# Patient Record
Sex: FEMALE | Race: WHITE | URBAN - METROPOLITAN AREA
[De-identification: names, ages, dates, MRNs, and addresses within clinical notes are randomized per-mention and may not be internally consistent; named-entity substitution may affect disease eponyms.]

---

## 2020-06-09 ENCOUNTER — OFFICE VISIT (OUTPATIENT)
Dept: FAMILY MEDICINE CLINIC | Facility: CLINIC | Age: 50
End: 2020-06-09
Payer: COMMERCIAL

## 2020-06-09 VITALS
RESPIRATION RATE: 18 BRPM | SYSTOLIC BLOOD PRESSURE: 120 MMHG | OXYGEN SATURATION: 98 % | HEIGHT: 65 IN | HEART RATE: 66 BPM | BODY MASS INDEX: 28.32 KG/M2 | WEIGHT: 170 LBS | DIASTOLIC BLOOD PRESSURE: 80 MMHG

## 2020-06-09 DIAGNOSIS — Z00.00 ENCOUNTER FOR WELLNESS EXAMINATION: Primary | ICD-10-CM

## 2020-06-09 PROCEDURE — 99396 PREV VISIT EST AGE 40-64: CPT | Performed by: FAMILY MEDICINE

## 2020-06-09 NOTE — PROGRESS NOTES
Patient presents for wellness visit and no complaints. She is never smoked cigarettes drinks alcohol modestly and works as a  currently on for low. No family history of cancers.     He is up-to-date on mammography no abnormalities of sign

## 2020-09-03 LAB
ABSOLUTE BASOPHILS: 30 CELLS/UL (ref 0–200)
ABSOLUTE EOSINOPHILS: 160 CELLS/UL (ref 15–500)
ABSOLUTE LYMPHOCYTES: 1725 CELLS/UL (ref 850–3900)
ABSOLUTE MONOCYTES: 675 CELLS/UL (ref 200–950)
ABSOLUTE NEUTROPHILS: 2410 CELLS/UL (ref 1500–7800)
ALBUMIN/GLOBULIN RATIO: 1.8 (CALC) (ref 1–2.5)
ALBUMIN: 4.2 G/DL (ref 3.6–5.1)
ALKALINE PHOSPHATASE: 53 U/L (ref 37–153)
ALT: 13 U/L (ref 6–29)
AST: 18 U/L (ref 10–35)
BASOPHILS: 0.6 %
BILIRUBIN, TOTAL: 0.7 MG/DL (ref 0.2–1.2)
BUN: 16 MG/DL (ref 7–25)
CALCIUM: 9.4 MG/DL (ref 8.6–10.4)
CARBON DIOXIDE: 27 MMOL/L (ref 20–32)
CHLORIDE: 107 MMOL/L (ref 98–110)
CHOL/HDLC RATIO: 3.4 (CALC)
CHOLESTEROL, TOTAL: 209 MG/DL
CREATININE: 0.78 MG/DL (ref 0.5–1.05)
EGFR IF AFRICN AM: 103 ML/MIN/1.73M2
EGFR IF NONAFRICN AM: 89 ML/MIN/1.73M2
EOSINOPHILS: 3.2 %
GLOBULIN: 2.4 G/DL (CALC) (ref 1.9–3.7)
GLUCOSE: 93 MG/DL (ref 65–99)
HDL CHOLESTEROL: 62 MG/DL
HEMATOCRIT: 36.7 % (ref 35–45)
HEMOGLOBIN: 12.2 G/DL (ref 11.7–15.5)
LDL-CHOLESTEROL: 122 MG/DL (CALC)
LYMPHOCYTES: 34.5 %
MCH: 30.2 PG (ref 27–33)
MCHC: 33.2 G/DL (ref 32–36)
MCV: 90.8 FL (ref 80–100)
MONOCYTES: 13.5 %
MPV: 10.2 FL (ref 7.5–12.5)
NEUTROPHILS: 48.2 %
NON-HDL CHOLESTEROL: 147 MG/DL (CALC)
PLATELET COUNT: 369 THOUSAND/UL (ref 140–400)
POTASSIUM: 4.9 MMOL/L (ref 3.5–5.3)
PROTEIN, TOTAL: 6.6 G/DL (ref 6.1–8.1)
RDW: 12.7 % (ref 11–15)
RED BLOOD CELL COUNT: 4.04 MILLION/UL (ref 3.8–5.1)
SODIUM: 139 MMOL/L (ref 135–146)
TRIGLYCERIDES: 133 MG/DL
TSH: 1.24 MIU/L
VITAMIN D, 25-OH, TOTAL: 25 NG/ML (ref 30–100)
WHITE BLOOD CELL COUNT: 5 THOUSAND/UL (ref 3.8–10.8)

## 2021-12-07 ENCOUNTER — TELEMEDICINE (OUTPATIENT)
Dept: FAMILY MEDICINE CLINIC | Facility: CLINIC | Age: 51
End: 2021-12-07
Payer: COMMERCIAL

## 2021-12-07 DIAGNOSIS — U07.1 COVID: Primary | ICD-10-CM

## 2021-12-07 PROCEDURE — 99213 OFFICE O/P EST LOW 20 MIN: CPT | Performed by: FAMILY MEDICINE

## 2021-12-07 NOTE — PROGRESS NOTES
This visit is conducted using Telemedicine with live, interactive video and audio. Patient has been referred to the [facility] website at [email or web address] to review the yearly Consent to Treat document.     Patient understands and accepts financial res

## 2023-01-23 ENCOUNTER — OFFICE VISIT (OUTPATIENT)
Dept: FAMILY MEDICINE CLINIC | Facility: CLINIC | Age: 53
End: 2023-01-23
Payer: COMMERCIAL

## 2023-01-23 VITALS
HEART RATE: 92 BPM | WEIGHT: 215 LBS | DIASTOLIC BLOOD PRESSURE: 86 MMHG | RESPIRATION RATE: 16 BRPM | SYSTOLIC BLOOD PRESSURE: 128 MMHG | HEIGHT: 65 IN | BODY MASS INDEX: 35.82 KG/M2 | OXYGEN SATURATION: 98 %

## 2023-01-23 DIAGNOSIS — Z12.11 SCREENING FOR COLON CANCER: ICD-10-CM

## 2023-01-23 DIAGNOSIS — Z13.220 LIPID SCREENING: ICD-10-CM

## 2023-01-23 DIAGNOSIS — Z13.29 THYROID DISORDER SCREEN: ICD-10-CM

## 2023-01-23 DIAGNOSIS — Z13.0 SCREENING FOR DEFICIENCY ANEMIA: ICD-10-CM

## 2023-01-23 DIAGNOSIS — Z00.00 WELLNESS EXAMINATION: Primary | ICD-10-CM

## 2023-01-23 DIAGNOSIS — Z23 NEED FOR TETANUS BOOSTER: ICD-10-CM

## 2023-01-23 DIAGNOSIS — Z12.31 ENCOUNTER FOR SCREENING MAMMOGRAM FOR MALIGNANT NEOPLASM OF BREAST: ICD-10-CM

## 2023-01-23 PROCEDURE — 3079F DIAST BP 80-89 MM HG: CPT | Performed by: FAMILY MEDICINE

## 2023-01-23 PROCEDURE — 90471 IMMUNIZATION ADMIN: CPT | Performed by: FAMILY MEDICINE

## 2023-01-23 PROCEDURE — 99396 PREV VISIT EST AGE 40-64: CPT | Performed by: FAMILY MEDICINE

## 2023-01-23 PROCEDURE — 3074F SYST BP LT 130 MM HG: CPT | Performed by: FAMILY MEDICINE

## 2023-01-23 PROCEDURE — 90715 TDAP VACCINE 7 YRS/> IM: CPT | Performed by: FAMILY MEDICINE

## 2023-01-23 PROCEDURE — 3008F BODY MASS INDEX DOCD: CPT | Performed by: FAMILY MEDICINE

## 2023-01-23 RX ORDER — FLUTICASONE PROPIONATE 50 MCG
SPRAY, SUSPENSION (ML) NASAL
COMMUNITY

## 2023-01-23 RX ORDER — ASCORBIC ACID 500 MG
TABLET ORAL
COMMUNITY

## 2023-01-23 RX ORDER — CHLORAL HYDRATE 500 MG
1000 CAPSULE ORAL DAILY
COMMUNITY

## 2023-01-24 ENCOUNTER — OFFICE VISIT (OUTPATIENT)
Dept: OBGYN CLINIC | Facility: CLINIC | Age: 53
End: 2023-01-24
Payer: COMMERCIAL

## 2023-01-24 VITALS
HEIGHT: 65 IN | HEART RATE: 71 BPM | BODY MASS INDEX: 35.82 KG/M2 | WEIGHT: 215 LBS | DIASTOLIC BLOOD PRESSURE: 76 MMHG | SYSTOLIC BLOOD PRESSURE: 114 MMHG

## 2023-01-24 DIAGNOSIS — Z01.419 WELL WOMAN EXAM WITH ROUTINE GYNECOLOGICAL EXAM: Primary | ICD-10-CM

## 2023-01-24 DIAGNOSIS — Z12.4 SCREENING FOR CERVICAL CANCER: ICD-10-CM

## 2023-01-24 PROCEDURE — 3008F BODY MASS INDEX DOCD: CPT | Performed by: NURSE PRACTITIONER

## 2023-01-24 PROCEDURE — 99386 PREV VISIT NEW AGE 40-64: CPT | Performed by: NURSE PRACTITIONER

## 2023-01-24 PROCEDURE — 3074F SYST BP LT 130 MM HG: CPT | Performed by: NURSE PRACTITIONER

## 2023-01-24 PROCEDURE — 3078F DIAST BP <80 MM HG: CPT | Performed by: NURSE PRACTITIONER

## 2023-01-24 RX ORDER — SOY ISOFLAV/BCOHOSH/CISSUS QUA 198 MG
CAPSULE ORAL
COMMUNITY

## 2023-01-27 LAB — HPV MRNA E6/E7: NOT DETECTED

## 2023-01-30 ENCOUNTER — HOSPITAL ENCOUNTER (OUTPATIENT)
Dept: MAMMOGRAPHY | Age: 53
Discharge: HOME | End: 2023-01-30
Attending: FAMILY MEDICINE
Payer: COMMERCIAL

## 2023-01-30 DIAGNOSIS — Z12.31 ENCOUNTER FOR SCREENING MAMMOGRAM FOR MALIGNANT NEOPLASM OF BREAST: ICD-10-CM

## 2023-01-30 PROCEDURE — 77063 BREAST TOMOSYNTHESIS BI: CPT | Performed by: FAMILY MEDICINE

## 2023-01-30 PROCEDURE — 77067 SCR MAMMO BI INCL CAD: CPT | Performed by: FAMILY MEDICINE

## 2023-02-04 LAB
ABSOLUTE BASOPHILS: 39 CELLS/UL (ref 0–200)
ABSOLUTE EOSINOPHILS: 149 CELLS/UL (ref 15–500)
ABSOLUTE LYMPHOCYTES: 1617 CELLS/UL (ref 850–3900)
ABSOLUTE MONOCYTES: 886 CELLS/UL (ref 200–950)
ABSOLUTE NEUTROPHILS: 2811 CELLS/UL (ref 1500–7800)
ALBUMIN/GLOBULIN RATIO: 1.9 (CALC) (ref 1–2.5)
ALBUMIN: 4.7 G/DL (ref 3.6–5.1)
ALKALINE PHOSPHATASE: 53 U/L (ref 37–153)
ALT: 62 U/L (ref 6–29)
AST: 42 U/L (ref 10–35)
BASOPHILS: 0.7 %
BILIRUBIN, TOTAL: 0.4 MG/DL (ref 0.2–1.2)
BUN: 14 MG/DL (ref 7–25)
CALCIUM: 9.6 MG/DL (ref 8.6–10.4)
CARBON DIOXIDE: 28 MMOL/L (ref 20–32)
CHLORIDE: 106 MMOL/L (ref 98–110)
CHOL/HDLC RATIO: 4.1 (CALC)
CHOLESTEROL, TOTAL: 215 MG/DL
CREATININE: 0.8 MG/DL (ref 0.5–1.03)
EGFR: 89 ML/MIN/1.73M2
EOSINOPHILS: 2.7 %
GLOBULIN: 2.5 G/DL (CALC) (ref 1.9–3.7)
GLUCOSE: 108 MG/DL (ref 65–99)
HDL CHOLESTEROL: 52 MG/DL
HEMATOCRIT: 40.7 % (ref 35–45)
HEMOGLOBIN: 13.2 G/DL (ref 11.7–15.5)
LDL-CHOLESTEROL: 132 MG/DL (CALC)
LYMPHOCYTES: 29.4 %
MCH: 30.2 PG (ref 27–33)
MCHC: 32.4 G/DL (ref 32–36)
MCV: 93.1 FL (ref 80–100)
MONOCYTES: 16.1 %
MPV: 9.8 FL (ref 7.5–12.5)
NEUTROPHILS: 51.1 %
NON-HDL CHOLESTEROL: 163 MG/DL (CALC)
PLATELET COUNT: 335 THOUSAND/UL (ref 140–400)
POTASSIUM: 4.5 MMOL/L (ref 3.5–5.3)
PROTEIN, TOTAL: 7.2 G/DL (ref 6.1–8.1)
RDW: 12.1 % (ref 11–15)
RED BLOOD CELL COUNT: 4.37 MILLION/UL (ref 3.8–5.1)
SODIUM: 141 MMOL/L (ref 135–146)
TRIGLYCERIDES: 172 MG/DL
TSH W/REFLEX TO FT4: 1 MIU/L
WHITE BLOOD CELL COUNT: 5.5 THOUSAND/UL (ref 3.8–10.8)

## 2023-02-05 ENCOUNTER — PATIENT MESSAGE (OUTPATIENT)
Dept: FAMILY MEDICINE CLINIC | Facility: CLINIC | Age: 53
End: 2023-02-05

## 2023-02-06 ENCOUNTER — PATIENT MESSAGE (OUTPATIENT)
Dept: FAMILY MEDICINE CLINIC | Facility: CLINIC | Age: 53
End: 2023-02-06

## 2023-02-06 DIAGNOSIS — E78.9 SERUM CHOLESTEROL ELEVATED: ICD-10-CM

## 2023-02-06 DIAGNOSIS — R74.8 LIVER ENZYME ELEVATION: Primary | ICD-10-CM

## 2023-02-06 NOTE — TELEPHONE ENCOUNTER
From: Kenney Howard  To: Kelsea Russell MD  Sent: 2/5/2023 11:21 AM CST  Subject: Liver Panel Results    We briefly discussed both my father and brother having elevated lipid panels throughout their medical history, although this is the first time I'm seeing that mine are elevated. One other thought that occurred to me after my appointment is that I have been using an over the counter anti-fungal liquid on my toe nail. I've opted not to use oral fungal Rx in the past because I know it can be hard on the liver. Could this product be causing the elevated levels? Pro Clearz 1%Tolnaftate. I have been using this over 16 weeks, but since I was seeing improvement with using it, I hadn't thought about discontinuing use yet. Any thoughts? I've stopped using it now until we discuss.

## 2023-02-06 NOTE — TELEPHONE ENCOUNTER
From: Geraldine Jacobson  To: Markus George MD  Sent: 2/5/2023 11:21 AM CST  Subject: Liver Panel Results    We briefly discussed both my father and brother having elevated lipid panels throughout their medical history, although this is the first time I'm seeing that mine are elevated. One other thought that occurred to me after my appointment is that I have been using an over the counter anti-fungal liquid on my toe nail. I've opted not to use oral fungal Rx in the past because I know it can be hard on the liver. Could this product be causing the elevated levels? Pro Clearz 1%Tolnaftate. I have been using this over 16 weeks, but since I was seeing improvement with using it, I hadn't thought about discontinuing use yet. Any thoughts? I've stopped using it now until we discuss.

## 2023-02-06 NOTE — TELEPHONE ENCOUNTER
I agree labs overall look good other than elevated lipids and mildly elevated liver enzymes    The 10-year ASCVD risk score (Neyda COSTA, et al., 2019) is: 1.4%    Values used to calculate the score:      Age: 46 years      Sex: Female      Is Non- : No      Diabetic: No      Tobacco smoker: No      Systolic Blood Pressure: 873 mmHg      Is BP treated: No      HDL Cholesterol: 52 mg/dL      Total Cholesterol: 215 mg/dL    Though still quite low 10 year vascular risk. I wouldn't expect the topical medicine to have that impact. My usual advice is, if diet isn't already close to a mediterranean diet, consider a diet such as that, low in red meats, high in plant based foods, and recheck and see if any substantial difference.     Mannie Fay MD

## 2023-02-06 NOTE — TELEPHONE ENCOUNTER
[facility] now wishes to know when you want her to repeat lipid panel and liver enzymes? 3 Months?

## 2023-03-09 PROBLEM — Z12.11 SPECIAL SCREENING FOR MALIGNANT NEOPLASM OF COLON: Status: ACTIVE | Noted: 2023-03-09

## 2023-06-21 ENCOUNTER — OFFICE VISIT (OUTPATIENT)
Dept: FAMILY MEDICINE CLINIC | Facility: CLINIC | Age: 53
End: 2023-06-21
Payer: COMMERCIAL

## 2023-06-21 VITALS
DIASTOLIC BLOOD PRESSURE: 88 MMHG | TEMPERATURE: 98 F | SYSTOLIC BLOOD PRESSURE: 128 MMHG | WEIGHT: 211 LBS | OXYGEN SATURATION: 96 % | HEART RATE: 80 BPM | BODY MASS INDEX: 35.16 KG/M2 | HEIGHT: 65 IN | RESPIRATION RATE: 16 BRPM

## 2023-06-21 DIAGNOSIS — R39.9 URINARY SYMPTOM OR SIGN: Primary | ICD-10-CM

## 2023-06-21 LAB
APPEARANCE: CLEAR
BILIRUBIN: NEGATIVE
GLUCOSE (URINE DIPSTICK): NEGATIVE MG/DL
KETONES (URINE DIPSTICK): NEGATIVE MG/DL
LEUKOCYTES: NEGATIVE
MULTISTIX LOT#: NORMAL NUMERIC
NITRITE, URINE: NEGATIVE
OCCULT BLOOD: NEGATIVE
PH, URINE: 6.5 (ref 4.5–8)
PROTEIN (URINE DIPSTICK): NEGATIVE MG/DL
SPECIFIC GRAVITY: 1 (ref 1–1.03)
UROBILINOGEN,SEMI-QN: 0.2 MG/DL (ref 0–1.9)

## 2023-06-21 PROCEDURE — 87086 URINE CULTURE/COLONY COUNT: CPT | Performed by: PHYSICIAN ASSISTANT

## 2023-06-21 PROCEDURE — 99213 OFFICE O/P EST LOW 20 MIN: CPT | Performed by: PHYSICIAN ASSISTANT

## 2023-06-21 PROCEDURE — 3008F BODY MASS INDEX DOCD: CPT | Performed by: PHYSICIAN ASSISTANT

## 2023-06-21 PROCEDURE — 81003 URINALYSIS AUTO W/O SCOPE: CPT | Performed by: PHYSICIAN ASSISTANT

## 2023-06-21 PROCEDURE — 3079F DIAST BP 80-89 MM HG: CPT | Performed by: PHYSICIAN ASSISTANT

## 2023-06-21 PROCEDURE — 3074F SYST BP LT 130 MM HG: CPT | Performed by: PHYSICIAN ASSISTANT

## 2023-07-03 ENCOUNTER — OFFICE VISIT (OUTPATIENT)
Dept: FAMILY MEDICINE CLINIC | Facility: CLINIC | Age: 53
End: 2023-07-03
Payer: COMMERCIAL

## 2023-07-03 VITALS
HEIGHT: 65 IN | BODY MASS INDEX: 35.16 KG/M2 | OXYGEN SATURATION: 96 % | RESPIRATION RATE: 16 BRPM | SYSTOLIC BLOOD PRESSURE: 122 MMHG | WEIGHT: 211 LBS | TEMPERATURE: 98 F | DIASTOLIC BLOOD PRESSURE: 74 MMHG | HEART RATE: 71 BPM

## 2023-07-03 DIAGNOSIS — R35.0 FREQUENT URINATION: Primary | ICD-10-CM

## 2023-07-03 PROCEDURE — 99212 OFFICE O/P EST SF 10 MIN: CPT | Performed by: FAMILY MEDICINE

## 2023-07-03 PROCEDURE — 3008F BODY MASS INDEX DOCD: CPT | Performed by: FAMILY MEDICINE

## 2023-07-03 PROCEDURE — 3074F SYST BP LT 130 MM HG: CPT | Performed by: FAMILY MEDICINE

## 2023-07-03 PROCEDURE — 3078F DIAST BP <80 MM HG: CPT | Performed by: FAMILY MEDICINE

## 2024-07-25 ENCOUNTER — OFFICE VISIT (OUTPATIENT)
Dept: OBGYN CLINIC | Facility: CLINIC | Age: 54
End: 2024-07-25
Payer: COMMERCIAL

## 2024-07-25 VITALS
DIASTOLIC BLOOD PRESSURE: 82 MMHG | BODY MASS INDEX: 34.07 KG/M2 | SYSTOLIC BLOOD PRESSURE: 128 MMHG | HEIGHT: 65 IN | WEIGHT: 204.5 LBS | HEART RATE: 79 BPM

## 2024-07-25 DIAGNOSIS — N93.0 PCB (POST COITAL BLEEDING): ICD-10-CM

## 2024-07-25 DIAGNOSIS — Z12.4 SCREENING FOR CERVICAL CANCER: ICD-10-CM

## 2024-07-25 DIAGNOSIS — Z12.31 ENCOUNTER FOR SCREENING MAMMOGRAM FOR BREAST CANCER: ICD-10-CM

## 2024-07-25 DIAGNOSIS — Z01.419 WELL WOMAN EXAM WITH ROUTINE GYNECOLOGICAL EXAM: Primary | ICD-10-CM

## 2024-07-25 PROCEDURE — 87591 N.GONORRHOEAE DNA AMP PROB: CPT | Performed by: NURSE PRACTITIONER

## 2024-07-25 PROCEDURE — 87491 CHLMYD TRACH DNA AMP PROBE: CPT | Performed by: NURSE PRACTITIONER

## 2024-07-25 PROCEDURE — 81514 NFCT DS BV&VAGINITIS DNA ALG: CPT | Performed by: NURSE PRACTITIONER

## 2024-07-25 PROCEDURE — 88175 CYTOPATH C/V AUTO FLUID REDO: CPT | Performed by: NURSE PRACTITIONER

## 2024-07-25 PROCEDURE — 99396 PREV VISIT EST AGE 40-64: CPT | Performed by: NURSE PRACTITIONER

## 2024-07-25 RX ORDER — SODIUM FLUORIDE 6 MG/ML
PASTE, DENTIFRICE DENTAL
COMMUNITY
Start: 2024-05-21

## 2024-07-25 NOTE — PROGRESS NOTES
Here for Routine Annual Exam    She had an approximate 1 week spotting episode following intercourse last week. It stopped yesterday. It was mostly increased discharge with pink and brown blood. She denies any other symptoms.  Menses have been absent about 18 months.  She is menopausal, she isn't sexually active often so she isn't sure if she has a lot of vaginal dryness.    ROS: No Cardiac, Respiratory, GI,  or Neurological symptoms.    PE:  GENERAL: well developed, well nourished, in no apparent distress  SKIN: no rashes, no suspicious lesions  HEENT: normal  NECK: supple; no thyroidmegaly, no adenopathy  LUNGS: clear to auscultation  CARDIOVASCULAR: normal S1, S2, RRR  BREASTS: firm, nontendder, no palpable masses or nodes, no nipple discharge, no skin changes, no axillary adenopathy,    ABDOMEN: Soft, non distended; non tender, no masses  GYNE/: External Genitalia: Normal without lesions or erythema                      Vagina: atrophic without lesions, scant discharge                      Uterus: mid, mobile, non tender, normal size                     Cervix: no lesions or CMT                     Adnexa: non tender, no masses, normal size  EXTREMITIES:  non tender without edema    A/P:   1. Well woman exam with routine gynecological exam    2. Screening for cervical cancer  - ThinPrep Pap with HPV Reflex, Chlamydia/GC; Future    3. PCB (post coital bleeding)  - Chlamydia/Gc Amplification  - Vaginitis Vaginosis PCR Panel; Future    4. Encounter for screening mammogram for breast cancer  - [facility] CAROLYN 2D+3D SCREENING BILAT (CPT=77067/60731); Future  Regular self breast exams recommended    Will plan a pelvic ultrasound if above normal     Return to clinic 1 year for routine exam, or as needed with any concerns or question

## 2024-07-26 LAB
C TRACH DNA SPEC QL NAA+PROBE: NEGATIVE
N GONORRHOEA DNA SPEC QL NAA+PROBE: NEGATIVE

## 2024-07-29 LAB
BV BACTERIA DNA VAG QL NAA+PROBE: NEGATIVE
C GLABRATA DNA VAG QL NAA+PROBE: NEGATIVE
C KRUSEI DNA VAG QL NAA+PROBE: NEGATIVE
CANDIDA DNA VAG QL NAA+PROBE: NEGATIVE
T VAGINALIS DNA VAG QL NAA+PROBE: NEGATIVE

## 2024-07-31 LAB
.: NORMAL
.: NORMAL

## 2024-08-02 ENCOUNTER — TELEPHONE (OUTPATIENT)
Dept: OBGYN CLINIC | Facility: CLINIC | Age: 54
End: 2024-08-02

## 2024-08-02 NOTE — TELEPHONE ENCOUNTER
Contacted patient results and recommendations given.   Patient verbalized understanding. [employer] message sent to patient. No further questions.

## 2024-08-02 NOTE — TELEPHONE ENCOUNTER
----- Message from Nasreen Padilla sent at 8/1/2024  5:49 PM CDT -----  Pap was insufficient possible from atrophy. Will await pelvic US. If additional testing is recommended we can repeal the pap at that time. Recommend OTC vaginal hyaluronic acid twice weekly in the meantime.    Vaginal Hyaluronic Acids-    Revaree, Repagyne, HyaloGYN, Bionourish, Gynatrof

## 2024-08-06 ENCOUNTER — HOSPITAL ENCOUNTER (OUTPATIENT)
Dept: MAMMOGRAPHY | Age: 54
Discharge: HOME | End: 2024-08-06
Attending: NURSE PRACTITIONER
Payer: COMMERCIAL

## 2024-08-06 DIAGNOSIS — Z12.31 ENCOUNTER FOR SCREENING MAMMOGRAM FOR BREAST CANCER: ICD-10-CM

## 2024-08-06 PROCEDURE — 77067 SCR MAMMO BI INCL CAD: CPT | Performed by: NURSE PRACTITIONER

## 2024-08-06 PROCEDURE — 77063 BREAST TOMOSYNTHESIS BI: CPT | Performed by: NURSE PRACTITIONER

## 2024-08-19 ENCOUNTER — ULTRASOUND ENCOUNTER (OUTPATIENT)
Dept: OBGYN CLINIC | Facility: CLINIC | Age: 54
End: 2024-08-19
Payer: COMMERCIAL

## 2024-08-19 DIAGNOSIS — N95.0 POSTMENOPAUSAL BLEEDING: Primary | ICD-10-CM

## 2024-08-19 PROCEDURE — 76830 TRANSVAGINAL US NON-OB: CPT | Performed by: STUDENT IN AN ORGANIZED HEALTH CARE EDUCATION/TRAINING PROGRAM

## 2024-08-21 NOTE — PROGRESS NOTES
Recommendations received back from Nasreen Padilla.   \"MD didn't recommend any additional follow up since it is < 5 cm and simple in nature\"    Provided to patient via [facility].

## 2024-10-08 NOTE — PROCEDURES
Endometrial Biopsy & Pap Smear Procedure Note    Indication and Diagnosis:  PMB  Thickened endometrium for a postmenopausal female. Normal appearing right ovary. Left ovary with simple cyst meauring 17mm in max diameter. Recommend endometrial sampling  Previous pap was unsatisfactory; plan repeat today. No hx of abnormal.     Procedure Details    Urine pregnancy test negative.  Patient is postmenopausal.   The risks (including infection, bleeding, pain, and uterine perforation) and benefits of the procedure were explained to the patient and informed consent was obtained.    The patient was placed in the dorsal lithotomy position.  Bimanual exam showed the uterus to be in the anteverted position.  The cervix was prepped with betadine solution.  2% Lidocaine jellly was applied to the anterior cervix and a tenaculum placed.    Using sterile technique, endometrial biopsy was performed using the pipelle catheter without complications.  The uterus sounded to 8 cm.  A small amount of tissue was obtained and sent to pathology for examination.    Condition:  Stable    Complications:  None    Plan:  The patient was advised to call for any fever or for prolonged or severe pain or bleeding. She was advised to use ibuprofen as needed for mild to moderate pain. She was advised to avoid vaginal intercourse for 48 hours or until the bleeding has completely stopped.

## 2024-10-09 ENCOUNTER — OFFICE VISIT (OUTPATIENT)
Dept: OBGYN CLINIC | Facility: CLINIC | Age: 54
End: 2024-10-09
Payer: COMMERCIAL

## 2024-10-09 VITALS
SYSTOLIC BLOOD PRESSURE: 128 MMHG | DIASTOLIC BLOOD PRESSURE: 90 MMHG | BODY MASS INDEX: 34.16 KG/M2 | WEIGHT: 205 LBS | HEIGHT: 65 IN | HEART RATE: 80 BPM

## 2024-10-09 DIAGNOSIS — Z12.4 CERVICAL CANCER SCREENING: ICD-10-CM

## 2024-10-09 DIAGNOSIS — Z01.818 PREPROCEDURAL EXAMINATION: ICD-10-CM

## 2024-10-09 DIAGNOSIS — N95.0 POSTMENOPAUSAL BLEEDING: Primary | ICD-10-CM

## 2024-10-09 DIAGNOSIS — R93.89 THICKENED ENDOMETRIUM: ICD-10-CM

## 2024-10-09 LAB
CONTROL LINE PRESENT WITH A CLEAR BACKGROUND (YES/NO): YES YES/NO
KIT LOT #: NORMAL NUMERIC
PREGNANCY TEST, URINE: NEGATIVE

## 2024-10-09 PROCEDURE — 88175 CYTOPATH C/V AUTO FLUID REDO: CPT | Performed by: OBSTETRICS & GYNECOLOGY

## 2024-10-09 PROCEDURE — 88305 TISSUE EXAM BY PATHOLOGIST: CPT | Performed by: OBSTETRICS & GYNECOLOGY

## 2024-10-09 PROCEDURE — 58100 BIOPSY OF UTERUS LINING: CPT | Performed by: OBSTETRICS & GYNECOLOGY

## 2024-10-09 PROCEDURE — 81025 URINE PREGNANCY TEST: CPT | Performed by: OBSTETRICS & GYNECOLOGY

## 2024-10-09 PROCEDURE — 87624 HPV HI-RISK TYP POOLED RSLT: CPT | Performed by: OBSTETRICS & GYNECOLOGY

## 2024-10-10 ENCOUNTER — OFFICE VISIT (OUTPATIENT)
Dept: ENDOCRINOLOGY CLINIC | Facility: CLINIC | Age: 54
End: 2024-10-10
Payer: COMMERCIAL

## 2024-10-10 VITALS
SYSTOLIC BLOOD PRESSURE: 156 MMHG | HEIGHT: 65 IN | WEIGHT: 206 LBS | BODY MASS INDEX: 34.32 KG/M2 | HEART RATE: 106 BPM | DIASTOLIC BLOOD PRESSURE: 88 MMHG

## 2024-10-10 DIAGNOSIS — R63.5 WEIGHT GAIN: ICD-10-CM

## 2024-10-10 DIAGNOSIS — R53.83 FATIGUE, UNSPECIFIED TYPE: Primary | ICD-10-CM

## 2024-10-10 DIAGNOSIS — N92.6 IRREGULAR MENSES: ICD-10-CM

## 2024-10-10 LAB — HPV E6+E7 MRNA CVX QL NAA+PROBE: NEGATIVE

## 2024-10-10 PROCEDURE — 99204 OFFICE O/P NEW MOD 45 MIN: CPT | Performed by: INTERNAL MEDICINE

## 2024-10-10 NOTE — H&P
New Patient Evaluation - History and Physical    CONSULT - Reason for Visit:  Menopausal Symptoms.  Requesting Physician: Self-Referral.    CHIEF COMPLAINT:    Chief Complaint   Patient presents with    Consult     menopause        HISTORY OF PRESENT ILLNESS:   Chantale Badillo is a 54 year old female who presents with menopausal symptoms. Her last period was 12/2022, and then she had spotting late June and early July.     She had a PAP smear that was inconclusive and an US that showed thickened endometrium (16.46mm) and then she has undergone a biopsy yesterday.     The first time that she had been spotting, it was post-coital- she may have had dyspareunia. No UTIs.     PAST MEDICAL HISTORY:   Past Medical History:    Anemia    Take Muti-vitamin, no issues lately    Endometriosis    Heart palpitations    Wore Holter monitor, diagnosed with PVC, no follow-up required unless bothering. No present issue.    Heavy menses    Since perimenopause began, heavier at times then normal    Hemorrhoids    Is usually not a problem, noticed one flared up a few months ago, gone, not an issue now    High cholesterol    elevated, working on diet & exercise, will repeat bloodwork in 2 months    Menses painful    Endometriosis, treated with ablation, no issues after pregnancy    Shortness of breath    After COVID, infrequent, only when going up stairs, can exercise, walk 2-5 miles no issues.    Sleep disturbance    Contributed to perimenopause    Wears glasses    Weight gain    Stopped Keto, not as frequent exercise, working on this now       PAST SURGICAL HISTORY:   Past Surgical History:   Procedure Laterality Date    Ablation  1995    Colonoscopy  2004    Have report, will submit PDF    Tonsillectomy  1974       SOCIAL HISTORY:    Social History     Socioeconomic History    Marital status:    Tobacco Use    Smoking status: Never    Smokeless tobacco: Never   Vaping Use    Vaping status: Never Used   Substance and Sexual  Activity    Alcohol use: Yes     Alcohol/week: 3.0 standard drinks of alcohol     Types: 2 Glasses of wine, 1 Standard drinks or equivalent per week     Comment: socially    Drug use: Never    Sexual activity: Yes     Partners: Male   Other Topics Concern    Caffeine Concern Yes    Exercise Yes    Seat Belt Yes       FAMILY HISTORY:   Family History   Problem Relation Age of Onset    Other (Other) Father         Diverticulitis, Kidney Stones, Basil Cell &    Diabetes Father     Hypertension Father     Other (Other) Mother         NH lymphoma Raynaud's Syndrome,Sjögren's syndrome, SVTs    Other (Other) Son         ADHD    Other (Other) Maternal Grandmother         Congestive heart failure    Cancer Maternal Grandmother         Stomach cancer    Diabetes Paternal Aunt     Hypertension Paternal Aunt     Uterine Cancer Paternal Aunt         Cervical Cancer, Complete Hysterectomy    Diabetes Paternal Aunt     Hypertension Paternal Aunt        CURRENT MEDICATIONS:    Current Outpatient Medications   Medication Sig Dispense Refill    Ciclopirox 8 % External Solution Apply 1 Application topically nightly. Apply to toenails daily, once a week clean nails with rubbing alcohol 6 mL 6    SODIUM FLUORIDE 5000 PPM 1.1 % Dental Paste BRUSH ONCE A DAY BEFORE BED. SPIT OUT REMAINDER. DO NOT RINSE.      Rhubarb (ESTROVEN MENOPAUSE RELIEF) 4 MG Oral Tab Take by mouth.      fluticasone propionate 50 MCG/ACT Nasal Suspension       Multiple Vitamin (MULTI-DAY) Oral Tab Take by mouth.      omega-3 fatty acids 1000 MG Oral Cap Take 1,000 mg by mouth daily.      Glucosamine-Chondroit-Collagen 250-200-116.67 MG Oral Cap Take by mouth.       Estroven Menopause Relief- started this 12/2022    ALLERGIES:  Allergies[1]      ASSESSMENTS:     REVIEW OF SYSTEMS:  Constitutional: Negative for: weight change, fever, fatigue, cold/heat intolerance  Eyes: Negative for:  Visual changes, proptosis, blurring  ENT: Negative for:  dysphagia, neck swelling,  dysphonia  Respiratory: Negative for:  dyspnea, cough  Cardiovascular: Negative for:  chest pain, palpitations, orthopnea  GI: Negative for:  abdominal pain, nausea, vomiting, diarrhea, constipation, bleeding  Neurology: Negative for: headache, numbness, weakness  Genito-Urinary: Negative for: dysuria, frequency  Psychiatric: Negative for:  depression, anxiety  Hematology/Lymphatics: Negative for: bruising, lower extremity edema  Endocrine: Negative for: polyuria, polydypsia  Skin: Negative for: rash, blister, cellulitis,      PHYSICAL EXAM:   Vitals:    10/10/24 1009   BP: 156/88   Pulse: 106   Weight: 206 lb (93.4 kg)   Height: 5' 5\" (1.651 m)     BMI: Body mass index is 34.28 kg/m².     CONSTITUTIONAL:  awake, alert, cooperative, no apparent distress, and appears stated age  PSYCH: normal affect  SKIN:  no bruising or bleeding, no rashes and no lesions  EXTREMITIES: no edema      DATA:   Reviewed    ASSESSMENT AND PLAN: This is a 54 year-old woman here for evaluation and management of menopausal symptoms. I would like to perform a complete hormonal and metabolic evaluation to determine if there are any factors that we can modify to help her feel better. She should fast and have these blood tests completed in the morning before 9AM.     We will discuss these test results in a few weeks and then come up with a ambrose for management.    Prior to this encounter, I spent over 20 minutes with preparing for the visit, reviewing documents from other specialties as well as from PCP, and going over test results and imaging studies. During the face to face encounter, I spent an additional 30 minutes which were determined for history-taking, physical examination, and orientation regarding our services. Greater than 50% of the time was spent in counseling, anticipatory guidance, and coordination of care. Patient concerns were answered to the best of my knowledge.         10/10/2024  Farzana Barba MD               [1] No  Known Allergies

## 2024-10-17 ENCOUNTER — PATIENT MESSAGE (OUTPATIENT)
Dept: ENDOCRINOLOGY CLINIC | Facility: CLINIC | Age: 54
End: 2024-10-17

## 2024-10-17 LAB
.: NORMAL
.: NORMAL

## 2024-10-19 PROBLEM — N92.6 IRREGULAR MENSES: Status: ACTIVE | Noted: 2024-10-19

## 2024-10-19 PROBLEM — R53.83 FATIGUE: Status: ACTIVE | Noted: 2024-10-19

## 2024-10-19 PROBLEM — R63.5 WEIGHT GAIN: Status: ACTIVE | Noted: 2024-10-19

## 2024-11-21 ENCOUNTER — OFFICE VISIT (OUTPATIENT)
Dept: ENDOCRINOLOGY CLINIC | Facility: CLINIC | Age: 54
End: 2024-11-21
Payer: COMMERCIAL

## 2024-11-21 DIAGNOSIS — Z78.0 POST-MENOPAUSAL: Primary | ICD-10-CM

## 2024-11-21 DIAGNOSIS — E88.819 INSULIN RESISTANCE: ICD-10-CM

## 2024-11-21 DIAGNOSIS — E53.8 VITAMIN B12 DEFICIENCY: ICD-10-CM

## 2024-11-21 DIAGNOSIS — E55.9 VITAMIN D DEFICIENCY: ICD-10-CM

## 2024-11-21 DIAGNOSIS — R73.03 PRE-DIABETES: ICD-10-CM

## 2024-11-21 PROCEDURE — 99214 OFFICE O/P EST MOD 30 MIN: CPT | Performed by: INTERNAL MEDICINE

## 2024-11-21 RX ORDER — ERGOCALCIFEROL 1.25 MG/1
50000 CAPSULE ORAL WEEKLY
Qty: 12 CAPSULE | Refills: 0 | Status: SHIPPED | OUTPATIENT
Start: 2024-11-21 | End: 2025-02-19

## 2024-11-21 NOTE — PROGRESS NOTES
Follow-up- Reason for Visit:  Menopausal Symptoms.  Requesting Physician: Self-Referral.    CHIEF COMPLAINT:    Chief Complaint   Patient presents with    Follow - Up     Test results         HISTORY OF PRESENT ILLNESS:   Chantale Badillo is a 54 year old female who presents with menopausal symptoms. Her last period was 12/2022, and then she had spotting late June and early July.     She had a PAP smear that was inconclusive and an US that showed thickened endometrium (16.46mm) and then she has undergone a biopsy yesterday.     The first time that she had been spotting, it was post-coital- she may have had dyspareunia. No UTIs.     At the last visit, I had decided to evaluate her hormonally as well as metabolically and she is here to discuss the test results.     PAST MEDICAL HISTORY:   Past Medical History:    Anemia    Take Muti-vitamin, no issues lately    Endometriosis    Heart palpitations    Wore Holter monitor, diagnosed with PVC, no follow-up required unless bothering. No present issue.    Heavy menses    Since perimenopause began, heavier at times then normal    Hemorrhoids    Is usually not a problem, noticed one flared up a few months ago, gone, not an issue now    High cholesterol    elevated, working on diet & exercise, will repeat bloodwork in 2 months    Menses painful    Endometriosis, treated with ablation, no issues after pregnancy    Shortness of breath    After COVID, infrequent, only when going up stairs, can exercise, walk 2-5 miles no issues.    Sleep disturbance    Contributed to perimenopause    Wears glasses    Weight gain    Stopped Keto, not as frequent exercise, working on this now       PAST SURGICAL HISTORY:   Past Surgical History:   Procedure Laterality Date    Ablation  1995    Colonoscopy  2004    Have report, will submit PDF    Tonsillectomy  1974       SOCIAL HISTORY:    Social History     Socioeconomic History    Marital status:    Tobacco Use    Smoking status: Never     Smokeless tobacco: Never   Vaping Use    Vaping status: Never Used   Substance and Sexual Activity    Alcohol use: Yes     Alcohol/week: 3.0 standard drinks of alcohol     Types: 2 Glasses of wine, 1 Standard drinks or equivalent per week     Comment: socially    Drug use: Never    Sexual activity: Yes     Partners: Male   Other Topics Concern    Caffeine Concern Yes    Exercise Yes    Seat Belt Yes       FAMILY HISTORY:   Family History   Problem Relation Age of Onset    Other (Other) Father         Diverticulitis, Kidney Stones, Basil Cell &    Diabetes Father     Hypertension Father     Other (Other) Mother         NH lymphoma Raynaud's Syndrome,Sjögren's syndrome, SVTs    Other (Other) Son         ADHD    Other (Other) Maternal Grandmother         Congestive heart failure    Cancer Maternal Grandmother         Stomach cancer    Diabetes Paternal Aunt     Hypertension Paternal Aunt     Uterine Cancer Paternal Aunt         Cervical Cancer, Complete Hysterectomy    Diabetes Paternal Aunt     Hypertension Paternal Aunt        CURRENT MEDICATIONS:    Current Outpatient Medications   Medication Sig Dispense Refill    Ciclopirox 8 % External Solution Apply 1 Application topically nightly. Apply to toenails daily, once a week clean nails with rubbing alcohol 6 mL 6    SODIUM FLUORIDE 5000 PPM 1.1 % Dental Paste BRUSH ONCE A DAY BEFORE BED. SPIT OUT REMAINDER. DO NOT RINSE.      Rhubarb (ESTROVEN MENOPAUSE RELIEF) 4 MG Oral Tab Take by mouth.      fluticasone propionate 50 MCG/ACT Nasal Suspension       Multiple Vitamin (MULTI-DAY) Oral Tab Take by mouth.      omega-3 fatty acids 1000 MG Oral Cap Take 1,000 mg by mouth daily.      Glucosamine-Chondroit-Collagen 250-200-116.67 MG Oral Cap Take by mouth.       Estroven Menopause Relief- started this 12/2022    ALLERGIES:  Allergies[1]      ASSESSMENTS:     REVIEW OF SYSTEMS:  Constitutional: Negative for: weight change, fever, fatigue, cold/heat intolerance  Eyes: Negative  for:  Visual changes, proptosis, blurring  ENT: Negative for:  dysphagia, neck swelling, dysphonia  Respiratory: Negative for:  dyspnea, cough  Cardiovascular: Negative for:  chest pain, palpitations, orthopnea  GI: Negative for:  abdominal pain, nausea, vomiting, diarrhea, constipation, bleeding  Neurology: Negative for: headache, numbness, weakness  Genito-Urinary: Negative for: dysuria, frequency  Psychiatric: Negative for:  depression, anxiety  Hematology/Lymphatics: Negative for: bruising, lower extremity edema  Endocrine: Negative for: polyuria, polydypsia  Skin: Negative for: rash, blister, cellulitis,      PHYSICAL EXAM:   Vitals:    11/21/24 0830   BP: (!) 164/85   Pulse: 71   Weight: 207 lb (93.9 kg)   Height: 5' 5\" (1.651 m)       BMI: Body mass index is 34.45 kg/m².     CONSTITUTIONAL:  awake, alert, cooperative, no apparent distress, and appears stated age  PSYCH: normal affect  SKIN:  no bruising or bleeding, no rashes and no lesions  EXTREMITIES: no edema      DATA:   11/14/24:  TSH- 1.00  FT4- 1.3  TGL- 140  HDL- 56  LDL- 122  Fasting Glucose- 118  Fasting Insulin- 27.3  Creatinine- 0.75  Fransisca Calcium- 9.2  HbA1c- 6.2  Vitamin B12- 1648  Total Cortisol- 10.9  ACTH-   DHEAS- 78  FSH- 55.9  LH- 21.9  Progesterone- <0.5  Estradiol- <15  Vitamin D- 27  Testosterone- 12  Free Testosterone- 2.4      ASSESSMENT AND PLAN: This is a 54 year-old woman here for follow-up of management of menopausal symptoms. I had performed a complete hormonal and metabolic evaluation to determine if there are any factors that we can modify to help her feel better. Aside from pre-diabetes as well as vitamin D deficiency, she is doing very well in her efforts. I would like to start her on high-dose vitamin D and recheck the level in 3 months. She is going to try her best to incorporate exercise and continue eating healthy and then we will recheck her labs in 3 months as well as DEXA scan. I have also given her a list of  weight-bearing exercises.     Return to clinic in 3 months      Prior to this encounter, I spent over 15 minutes with preparing for the visit, including reviewing documents from other specialties as well as from PCP and going over test results and imaging studies. During the face to face encounter, I spent an additional 15 minutes which were determined for follow-up. Greater than 50% of the time was spent in counseling, anticipatory guidance, and coordination of care. Patient concerns were answered to the best of my knowledge.     11/21/24  Farzana Barba MD               [1] No Known Allergies

## 2024-11-22 ENCOUNTER — TELEPHONE (OUTPATIENT)
Dept: ENDOCRINOLOGY CLINIC | Facility: CLINIC | Age: 54
End: 2024-11-22

## 2024-11-22 VITALS
DIASTOLIC BLOOD PRESSURE: 80 MMHG | HEART RATE: 71 BPM | SYSTOLIC BLOOD PRESSURE: 140 MMHG | WEIGHT: 207 LBS | BODY MASS INDEX: 34.49 KG/M2 | HEIGHT: 65 IN

## 2024-11-22 LAB
% SATURATION: 25 % (CALC) (ref 16–45)
ABSOLUTE BASOPHILS: 32 CELLS/UL (ref 0–200)
ABSOLUTE EOSINOPHILS: 148 CELLS/UL (ref 15–500)
ABSOLUTE LYMPHOCYTES: 2263 CELLS/UL (ref 850–3900)
ABSOLUTE MONOCYTES: 551 CELLS/UL (ref 200–950)
ABSOLUTE NEUTROPHILS: 2306 CELLS/UL (ref 1500–7800)
ACTH, PLASMA: 38 PG/ML (ref 6–50)
ALBUMIN/GLOBULIN RATIO: 1.7 (CALC) (ref 1–2.5)
ALBUMIN: 4.3 G/DL (ref 3.6–5.1)
ALKALINE PHOSPHATASE: 62 U/L (ref 37–153)
ALT: 31 U/L (ref 6–29)
AST: 25 U/L (ref 10–35)
BASOPHILS: 0.6 %
BILIRUBIN, TOTAL: 0.4 MG/DL (ref 0.2–1.2)
BUN: 16 MG/DL (ref 7–25)
CALCIUM: 9.4 MG/DL (ref 8.6–10.4)
CARBON DIOXIDE: 24 MMOL/L (ref 20–32)
CHLORIDE: 108 MMOL/L (ref 98–110)
CHOL/HDLC RATIO: 3.6 (CALC)
CHOLESTEROL, TOTAL: 203 MG/DL
CORTISOL, TOTAL: 10.9 MCG/DL
CREATININE: 0.75 MG/DL (ref 0.5–1.03)
DHEA SULFATE: 78 MCG/DL (ref 5–167)
EGFR: 95 ML/MIN/1.73M2
EOSINOPHILS: 2.8 %
ESTRADIOL: <15 PG/ML
FERRITIN: 110 NG/ML (ref 16–232)
FREE TESTOSTERONE: 2.4 PG/ML (ref 0.1–6.4)
FSH: 55.9 MIU/ML
GLOBULIN: 2.5 G/DL (CALC) (ref 1.9–3.7)
GLUCOSE: 118 MG/DL (ref 65–99)
HDL CHOLESTEROL: 56 MG/DL
HEMATOCRIT: 41.4 % (ref 35–45)
HEMOGLOBIN A1C: 6.2 % OF TOTAL HGB
HEMOGLOBIN: 13.3 G/DL (ref 11.7–15.5)
INSULIN: 27.3 UIU/ML
IRON BINDING CAPACITY: 400 MCG/DL (CALC) (ref 250–450)
IRON, TOTAL: 101 MCG/DL (ref 45–160)
LDL-CHOLESTEROL: 122 MG/DL (CALC)
LH: 21.8 MIU/ML
LYMPHOCYTES: 42.7 %
MCH: 30.7 PG (ref 27–33)
MCHC: 32.1 G/DL (ref 32–36)
MCV: 95.6 FL (ref 80–100)
MONOCYTES: 10.4 %
MPV: 10.3 FL (ref 7.5–12.5)
NEUTROPHILS: 43.5 %
NON-HDL CHOLESTEROL: 147 MG/DL (CALC)
PLATELET COUNT: 320 THOUSAND/UL (ref 140–400)
POTASSIUM: 4.3 MMOL/L (ref 3.5–5.3)
PROGESTERONE: <0.5 NG/ML
PROTEIN, TOTAL: 6.8 G/DL (ref 6.1–8.1)
RDW: 12.7 % (ref 11–15)
RED BLOOD CELL COUNT: 4.33 MILLION/UL (ref 3.8–5.1)
SODIUM: 141 MMOL/L (ref 135–146)
T3, FREE: 3.5 PG/ML (ref 2.3–4.2)
T4, FREE: 1.3 NG/DL (ref 0.8–1.8)
TESTOSTERONE, TOTAL,$/MS/MS: 12 NG/DL (ref 2–45)
TRIGLYCERIDES: 140 MG/DL
TSH: 1 MIU/L
VITAMIN B12: 1648 PG/ML (ref 200–1100)
VITAMIN D, 25-OH, TOTAL: 27 NG/ML (ref 30–100)
WHITE BLOOD CELL COUNT: 5.3 THOUSAND/UL (ref 3.8–10.8)

## 2025-01-24 ENCOUNTER — TELEPHONE (OUTPATIENT)
Dept: ENDOCRINOLOGY CLINIC | Facility: CLINIC | Age: 55
End: 2025-01-24

## 2025-01-24 ENCOUNTER — PATIENT MESSAGE (OUTPATIENT)
Dept: ENDOCRINOLOGY CLINIC | Facility: CLINIC | Age: 55
End: 2025-01-24

## 2025-01-24 NOTE — TELEPHONE ENCOUNTER
Patient requesting Dexa Scan orders to be faxed to [facility] (# [phone]).  Patient did not have fa number and asking Rn to reach out to facility.  Please call patient once orders have been faxedl.  Thank you.

## 2025-03-07 ENCOUNTER — PATIENT MESSAGE (OUTPATIENT)
Dept: ENDOCRINOLOGY CLINIC | Facility: CLINIC | Age: 55
End: 2025-03-07

## 2025-03-17 ENCOUNTER — PATIENT MESSAGE (OUTPATIENT)
Dept: ENDOCRINOLOGY CLINIC | Facility: CLINIC | Age: 55
End: 2025-03-17

## 2025-03-17 LAB
ALBUMIN/GLOBULIN RATIO: 1.8 (CALC) (ref 1–2.5)
ALBUMIN: 4.6 G/DL (ref 3.6–5.1)
ALKALINE PHOSPHATASE: 62 U/L (ref 37–153)
ALT: 36 U/L (ref 6–29)
AST: 24 U/L (ref 10–35)
BILIRUBIN, TOTAL: 0.5 MG/DL (ref 0.2–1.2)
BUN: 14 MG/DL (ref 7–25)
CALCIUM: 10 MG/DL (ref 8.6–10.4)
CARBON DIOXIDE: 29 MMOL/L (ref 20–32)
CHLORIDE: 105 MMOL/L (ref 98–110)
CHOL/HDLC RATIO: 3.8 (CALC)
CHOLESTEROL, TOTAL: 199 MG/DL
CREATININE: 0.76 MG/DL (ref 0.5–1.03)
EGFR: 93 ML/MIN/1.73M2
GLOBULIN: 2.6 G/DL (CALC) (ref 1.9–3.7)
GLUCOSE: 108 MG/DL (ref 65–99)
HDL CHOLESTEROL: 52 MG/DL
HEMOGLOBIN A1C: 6.5 % OF TOTAL HGB
INSULIN: 18.2 UIU/ML
LDL-CHOLESTEROL: 119 MG/DL (CALC)
NON-HDL CHOLESTEROL: 147 MG/DL (CALC)
POTASSIUM: 4.9 MMOL/L (ref 3.5–5.3)
PROTEIN, TOTAL: 7.2 G/DL (ref 6.1–8.1)
SODIUM: 141 MMOL/L (ref 135–146)
TRIGLYCERIDES: 167 MG/DL
VITAMIN B12: 604 PG/ML (ref 200–1100)
VITAMIN D, 25-OH, TOTAL: 42 NG/ML (ref 30–100)

## 2025-03-19 ENCOUNTER — MED REC SCAN ONLY (OUTPATIENT)
Dept: ENDOCRINOLOGY CLINIC | Facility: CLINIC | Age: 55
End: 2025-03-19

## 2025-03-19 NOTE — TELEPHONE ENCOUNTER
Called [facility] to request Dexa scan be faxed to office. Provided fax number.     Dr. Barba,     Dexa Scan received and placed in folder for review.

## 2025-03-19 NOTE — TELEPHONE ENCOUNTER
Patient called to see if results have been received.  If not, please call [facility] at [phone].

## 2025-03-21 NOTE — TELEPHONE ENCOUNTER
Patient called. Verified name and date of birth. Patient wanted to confirm if Dexa scan was received. Per documentation below dexa received on 3/19/25. Patient informed and she was appreciative of update.     Will await Dr. Barba's recommendations on faxed dexa results.

## 2025-04-01 ENCOUNTER — OFFICE VISIT (OUTPATIENT)
Dept: FAMILY MEDICINE CLINIC | Facility: CLINIC | Age: 55
End: 2025-04-01
Payer: COMMERCIAL

## 2025-04-01 VITALS
BODY MASS INDEX: 34.49 KG/M2 | DIASTOLIC BLOOD PRESSURE: 84 MMHG | SYSTOLIC BLOOD PRESSURE: 128 MMHG | WEIGHT: 207 LBS | HEIGHT: 65 IN

## 2025-04-01 DIAGNOSIS — H60.503 ACUTE OTITIS EXTERNA OF BOTH EARS, UNSPECIFIED TYPE: Primary | ICD-10-CM

## 2025-04-01 PROCEDURE — 99213 OFFICE O/P EST LOW 20 MIN: CPT | Performed by: FAMILY MEDICINE

## 2025-04-01 RX ORDER — NEOMYCIN SULFATE, POLYMYXIN B SULFATE, HYDROCORTISONE 3.5; 10000; 1 MG/ML; [USP'U]/ML; MG/ML
3 SOLUTION/ DROPS AURICULAR (OTIC) 3 TIMES DAILY
Qty: 10 ML | Refills: 0 | Status: SHIPPED | OUTPATIENT
Start: 2025-04-01

## 2025-04-01 NOTE — PROGRESS NOTES
[facility] Progress Note    SUBJECTIVE: Chantale Badillo 55 year old female is here today for   Chief Complaint   Patient presents with    Ear Problem     WOULD LIKE HER EARS LOOKED AT. Her ears have been bothering her and she is supposed to fly tomorrow.      Skin     Has a discolored spot near the left pelvic area she noticed two weeks ago and has gotten bigger   Doesn't hurt but is uncomfortable        Noting her ears were getting more flaky, this past week heavier, and when cleaning out noting some discharge. Feels a dull ache.    Recently had air ducts cleaned. Typically takes flonase and has been using regularly.    Has a spot on skin that looked like a small bruise but larger    PMH  Past Medical History:    Anemia    Take Muti-vitamin, no issues lately    Endometriosis    Heart palpitations    Wore Holter monitor, diagnosed with PVC, no follow-up required unless bothering. No present issue.    Heavy menses    Since perimenopause began, heavier at times then normal    Hemorrhoids    Is usually not a problem, noticed one flared up a few months ago, gone, not an issue now    High cholesterol    elevated, working on diet & exercise, will repeat bloodwork in 2 months    Menses painful    Endometriosis, treated with ablation, no issues after pregnancy    Shortness of breath    After COVID, infrequent, only when going up stairs, can exercise, walk 2-5 miles no issues.    Sleep disturbance    Contributed to perimenopause    Wears glasses    Weight gain    Stopped Keto, not as frequent exercise, working on this now        [facility]  Past Surgical History:   Procedure Laterality Date    Ablation  1995    Colonoscopy  2004    Have report, will submit PDF    Tonsillectomy  1974        Social Hx:  No changes    ROS  See HPI    OBJECTIVE:  /84   Ht 5' 5\" (1.651 m)   Wt 207 lb (93.9 kg)   BMI 34.45 kg/m²     Exam  Gen: No acute distress, alert and oriented x3, no focal neurologic deficits  ENT: ear canals  red, irritated, flaking at outer area.      Labs:          Meds:   Current Outpatient Medications   Medication Sig Dispense Refill    BONAFIDE REVAREE, [facility] AMB, Place 1 suppository vaginally twice a week.      neomycin-polymyxin-hydrocortisone 3.5-27841-3 Otic Solution Place 3 drops into the right ear 3 (three) times daily. 10 mL 0    Ciclopirox 8 % External Solution Apply 1 Application topically nightly. Apply to toenails daily, once a week clean nails with rubbing alcohol 6 mL 4    SODIUM FLUORIDE 5000 PPM 1.1 % Dental Paste BRUSH ONCE A DAY BEFORE BED. SPIT OUT REMAINDER. DO NOT RINSE.      Rhubarb (ESTROVEN MENOPAUSE RELIEF) 4 MG Oral Tab Take by mouth.      fluticasone propionate 50 MCG/ACT Nasal Suspension       Multiple Vitamin (MULTI-DAY) Oral Tab Take by mouth.      omega-3 fatty acids 1000 MG Oral Cap Take 1,000 mg by mouth daily.      Glucosamine-Chondroit-Collagen 250-200-116.67 MG Oral Cap Take by mouth.           Assessment/Plan  Chantale was seen today for ear problem and skin.    Diagnoses and all orders for this visit:    Acute otitis externa of both ears, unspecified type  -     neomycin-polymyxin-hydrocortisone 3.5-90283-4 Otic Solution; Place 3 drops into the right ear 3 (three) times daily.         Will treat as possible otitis externa, if not improving to let me know.         Total Time spent with patient and coordinating care:  15 minutes.    Follow up: as needed      Juan Smith MD

## 2025-04-03 NOTE — TELEPHONE ENCOUNTER
Endo staff - is this result still in the offic e to review?  Can you place in my folder? Thanks.

## 2025-04-04 NOTE — TELEPHONE ENCOUNTER
MA called [employer] spoke with rep who stated they had issues with their fax machine recently but will try again MA provided fax # at the station instead of . Awaiting fax

## 2025-04-07 ENCOUNTER — TELEPHONE (OUTPATIENT)
Dept: ENDOCRINOLOGY CLINIC | Facility: CLINIC | Age: 55
End: 2025-04-07

## 2025-05-05 ENCOUNTER — OFFICE VISIT (OUTPATIENT)
Dept: FAMILY MEDICINE CLINIC | Facility: CLINIC | Age: 55
End: 2025-05-05
Payer: COMMERCIAL

## 2025-05-05 VITALS
WEIGHT: 205 LBS | DIASTOLIC BLOOD PRESSURE: 80 MMHG | BODY MASS INDEX: 34 KG/M2 | OXYGEN SATURATION: 97 % | HEART RATE: 64 BPM | TEMPERATURE: 98 F | SYSTOLIC BLOOD PRESSURE: 148 MMHG | RESPIRATION RATE: 20 BRPM

## 2025-05-05 DIAGNOSIS — J32.9 SINOBRONCHITIS: Primary | ICD-10-CM

## 2025-05-05 DIAGNOSIS — J40 SINOBRONCHITIS: Primary | ICD-10-CM

## 2025-05-05 RX ORDER — PREDNISONE 20 MG/1
TABLET ORAL
Qty: 10 TABLET | Refills: 0 | Status: SHIPPED | OUTPATIENT
Start: 2025-05-05

## 2025-05-05 RX ORDER — AMOXICILLIN 875 MG/1
875 TABLET, COATED ORAL 2 TIMES DAILY
Qty: 14 TABLET | Refills: 0 | Status: SHIPPED | OUTPATIENT
Start: 2025-05-05 | End: 2025-05-12

## 2025-05-05 RX ORDER — ALBUTEROL SULFATE 90 UG/1
2 INHALANT RESPIRATORY (INHALATION) EVERY 4 HOURS PRN
Qty: 18 G | Refills: 0 | Status: SHIPPED | OUTPATIENT
Start: 2025-05-05 | End: 2025-12-01

## 2025-05-05 NOTE — PROGRESS NOTES
CHIEF COMPLAINT:     Chief Complaint   Patient presents with    Cold     Started as allergies, almost 14 days.  Managing with Mucinex and OTC Aller-Meg and last couple days using my inhaler from a previous respiratory issue as I have now started wheezing and hearing some crackling.  Mucas was clear, now darker. No fever.        HPI:   Chantale Badillo is a 55 year old female who presents for upper respiratory symptoms for  2 weeks. Patient reports sinus congestion, sinus pressure, rhinitis, headache, coughing, now wheezing, dark mucous. Symptoms have been worsening since onset.  Treating symptoms with mucinex, albuterol, delsym, aller-meg.      Current Medications[1]   Past Medical History[2]   Past Surgical History[3]      Short Social Hx on File[4]      REVIEW OF SYSTEMS:   GENERAL: intact appetite  SKIN: no rashes or abnormal skin lesions  HEENT: See HPI  LUNGS: See HPI  CARDIOVASCULAR: denies chest pain or palpitations   GI: denies N/V/C or abdominal pain      EXAM:   /80   Pulse 64   Temp 97.8 °F (36.6 °C) (Oral)   Resp 20   Wt 205 lb (93 kg)   SpO2 97%   BMI 34.11 kg/m²   GENERAL: well developed, well nourished,in no apparent distress  SKIN: no rashes,no suspicious lesions  HEAD: atraumatic, normocephalic.  + tenderness on palpation of maxillary sinuses  EYES: conjunctiva clear, EOM intact  EARS: TM's grey, no bulging, no retraction, no fluid, bony landmarks visible  NOSE: Nostrils patent, no nasal discharge, nasal mucosa + erythema   THROAT: Oral mucosa pink, moist. Posterior pharynx is not erythematous. no exudates.     NECK: Supple, non-tender  LUNGS: + wheeze + rhonchi RUL, + rhonchi noted to JOSEF, bilat LL, breathing is non labored.  CARDIO: RRR without murmur  EXTREMITIES: no cyanosis, clubbing or edema  LYMPH:  no cervical lymphadenopathy.    PSYCH: pleasant mood and affect  NEURO: no focal deficits      ASSESSMENT AND PLAN:   Chantale Badillo is a 55 year old female who presents with upper  respiratory symptoms that are consistent with    ASSESSMENT:   Encounter Diagnosis   Name Primary?    Sinobronchitis Yes       PLAN: Meds as below.  Comfort care as described in Patient Instructions    Meds & Refills for this Visit:  Requested Prescriptions     Signed Prescriptions Disp Refills    amoxicillin 875 MG Oral Tab 14 tablet 0     Sig: Take 1 tablet (875 mg total) by mouth 2 (two) times daily for 7 days.    predniSONE 20 MG Oral Tab 10 tablet 0     Si tabs PO QAM x 5 days    albuterol (VENTOLIN HFA) 108 (90 Base) MCG/ACT Inhalation Aero Soln 18 g 0     Sig: Inhale 2 puffs into the lungs every 4 (four) hours as needed for Wheezing or Shortness of Breath.     Risks, benefits, and side effects of medication explained and discussed.    The patient indicates understanding of these issues and agrees to the plan.  The patient is asked to f/u with PCP if sx's persist or worsen.  There are no Patient Instructions on file for this visit.             [1]   Current Outpatient Medications   Medication Sig Dispense Refill    amoxicillin 875 MG Oral Tab Take 1 tablet (875 mg total) by mouth 2 (two) times daily for 7 days. 14 tablet 0    predniSONE 20 MG Oral Tab 2 tabs PO QAM x 5 days 10 tablet 0    albuterol (VENTOLIN HFA) 108 (90 Base) MCG/ACT Inhalation Aero Soln Inhale 2 puffs into the lungs every 4 (four) hours as needed for Wheezing or Shortness of Breath. 18 g 0    Rhubarb (ESTROVEN MENOPAUSE RELIEF) 4 MG Oral Tab Take by mouth.      Multiple Vitamin (MULTI-DAY) Oral Tab Take by mouth.      omega-3 fatty acids 1000 MG Oral Cap Take 1,000 mg by mouth daily.      Glucosamine-Chondroit-Collagen 250-200-116.67 MG Oral Cap Take by mouth.      BONAFIDE REVAREE, [facility] AMB, Place 1 suppository vaginally twice a week.      neomycin-polymyxin-hydrocortisone 3.5-04511-9 Otic Solution Place 3 drops into the right ear 3 (three) times daily. (Patient not taking: Reported on 2025) 10 mL 0    Ciclopirox 8 % External  Solution Apply 1 Application topically nightly. Apply to toenails daily, once a week clean nails with rubbing alcohol 6 mL 4    SODIUM FLUORIDE 5000 PPM 1.1 % Dental Paste BRUSH ONCE A DAY BEFORE BED. SPIT OUT REMAINDER. DO NOT RINSE.      fluticasone propionate 50 MCG/ACT Nasal Suspension      [2]   Past Medical History:   Anemia    Take Muti-vitamin, no issues lately    Endometriosis    Heart palpitations    Wore Holter monitor, diagnosed with PVC, no follow-up required unless bothering. No present issue.    Heavy menses    Since perimenopause began, heavier at times then normal    Hemorrhoids    Is usually not a problem, noticed one flared up a few months ago, gone, not an issue now    High cholesterol    elevated, working on diet & exercise, will repeat bloodwork in 2 months    Menses painful    Endometriosis, treated with ablation, no issues after pregnancy    Shortness of breath    After COVID, infrequent, only when going up stairs, can exercise, walk 2-5 miles no issues.    Sleep disturbance    Contributed to perimenopause    Wears glasses    Weight gain    Stopped Keto, not as frequent exercise, working on this now   [3]   Past Surgical History:  Procedure Laterality Date    Ablation  1995    Colonoscopy  2004    Have report, will submit PDF    Tonsillectomy  1974   [4]   Social History  Socioeconomic History    Marital status:    Tobacco Use    Smoking status: Never    Smokeless tobacco: Never   Vaping Use    Vaping status: Never Used   Substance and Sexual Activity    Alcohol use: Yes     Alcohol/week: 3.0 standard drinks of alcohol     Types: 2 Glasses of wine, 1 Standard drinks or equivalent per week     Comment: socially    Drug use: Never    Sexual activity: Yes     Partners: Male   Other Topics Concern    Caffeine Concern Yes    Exercise Yes    Seat Belt Yes

## 2025-05-16 ENCOUNTER — TELEPHONE (OUTPATIENT)
Dept: ENDOCRINOLOGY CLINIC | Facility: CLINIC | Age: 55
End: 2025-05-16

## 2025-05-16 ENCOUNTER — OFFICE VISIT (OUTPATIENT)
Dept: FAMILY MEDICINE CLINIC | Facility: CLINIC | Age: 55
End: 2025-05-16
Payer: COMMERCIAL

## 2025-05-16 VITALS
HEART RATE: 67 BPM | BODY MASS INDEX: 33.96 KG/M2 | DIASTOLIC BLOOD PRESSURE: 78 MMHG | OXYGEN SATURATION: 97 % | WEIGHT: 203.81 LBS | HEIGHT: 65 IN | RESPIRATION RATE: 18 BRPM | SYSTOLIC BLOOD PRESSURE: 130 MMHG

## 2025-05-16 DIAGNOSIS — J01.91 ACUTE RECURRENT SINUSITIS, UNSPECIFIED LOCATION: Primary | ICD-10-CM

## 2025-05-16 PROCEDURE — 99213 OFFICE O/P EST LOW 20 MIN: CPT | Performed by: FAMILY MEDICINE

## 2025-05-16 NOTE — TELEPHONE ENCOUNTER
Patient is following up on Dexa bone density results please follow up and please call patient. Patient is also requesting for the results to be uploaded to [employer]

## 2025-05-16 NOTE — PROGRESS NOTES
[facility] Progress Note    SUBJECTIVE: Chantale Badillo 55 year old female is here today for   Chief Complaint   Patient presents with    Bronchitis     Sinus Infection too, Finished antibiotics on Monday    Sinus Problem     Burning eyes, congestion, symptoms still persist  No fever       Has had ongoing illness, went ot walk in clinic and was given amoxicillin and finished that, and steroid cours. Fels like sinus issues still going on    Never 100% normal, going out of the country [place] month.    No fever, when started used netipot, discharge was clear, thicker and yellow when presented to walk in clinic    Dealing with menopause    The 10-year ASCVD risk score (Neyda COSTA, et al., 2019) is: 2.1%    Values used to calculate the score:      Age: 55 years      Sex: Female      Is Non- : No      Diabetic: No      Tobacco smoker: No      Systolic Blood Pressure: 130 mmHg      Is BP treated: No      HDL Cholesterol: 52 mg/dL      Total Cholesterol: 199 mg/dL      PMH  Past Medical History[1]     PSH  Past Surgical History[2]     Social Hx:  No changes    ROS  See HPI    OBJECTIVE:  /78 (BP Location: Right arm, Patient Position: Sitting, Cuff Size: adult)   Pulse 67   Resp 18   Ht 5' 5\" (1.651 m)   Wt 203 lb 12.8 oz (92.4 kg)   SpO2 97%   BMI 33.91 kg/m²     Exam  Gen: No acute distress, alert and oriented x3, no focal neurologic deficits  ENT: PERRLA, EOMI, TM clear  CV: RRR, s1 and s2 present, no murmurs clicks or rubs      Labs:          Meds:   Current Medications[3]      Assessment/Plan  Chantale was seen today for bronchitis and sinus problem.    Diagnoses and all orders for this visit:    Acute recurrent sinusitis, unspecified location  -     amoxicillin clavulanate 875-125 MG Oral Tab; Take 1 tablet by mouth 2 (two) times daily.         Will treat for recurrent sinusitis, and if not improving to let me know, ok to continue nasal steroids.         Total Time spent with  patient and coordinating care:  15 minutes.    Follow up: as needed      Juan Smith MD         [1]   Past Medical History:   Anemia    Take Muti-vitamin, no issues lately    Endometriosis    Heart palpitations    Wore Holter monitor, diagnosed with PVC, no follow-up required unless bothering. No present issue.    Heavy menses    Since perimenopause began, heavier at times then normal    Hemorrhoids    Is usually not a problem, noticed one flared up a few months ago, gone, not an issue now    High cholesterol    elevated, working on diet & exercise, will repeat bloodwork in 2 months    Menses painful    Endometriosis, treated with ablation, no issues after pregnancy    Shortness of breath    After COVID, infrequent, only when going up stairs, can exercise, walk 2-5 miles no issues.    Sleep disturbance    Contributed to perimenopause    Wears glasses    Weight gain    Stopped Keto, not as frequent exercise, working on this now   [2]   Past Surgical History:  Procedure Laterality Date    Ablation  1995    Colonoscopy  2004    Have report, will submit PDF    Tonsillectomy  1974   [3]   Current Outpatient Medications   Medication Sig Dispense Refill    amoxicillin clavulanate 875-125 MG Oral Tab Take 1 tablet by mouth 2 (two) times daily. 20 tablet 0    BONAFIDE REVAREE, [facility] AMB, Place 1 suppository vaginally twice a week.      Ciclopirox 8 % External Solution Apply 1 Application topically nightly. Apply to toenails daily, once a week clean nails with rubbing alcohol 6 mL 4    SODIUM FLUORIDE 5000 PPM 1.1 % Dental Paste BRUSH ONCE A DAY BEFORE BED. SPIT OUT REMAINDER. DO NOT RINSE.      Rhubarb (ESTROVEN MENOPAUSE RELIEF) 4 MG Oral Tab Take by mouth.      fluticasone propionate 50 MCG/ACT Nasal Suspension       Multiple Vitamin (MULTI-DAY) Oral Tab Take by mouth.      omega-3 fatty acids 1000 MG Oral Cap Take 1,000 mg by mouth daily.      Glucosamine-Chondroit-Collagen 250-200-116.67 MG Oral Cap Take by mouth.       predniSONE 20 MG Oral Tab 2 tabs PO QAM x 5 days 10 tablet 0    albuterol (VENTOLIN HFA) 108 (90 Base) MCG/ACT Inhalation Aero Soln Inhale 2 puffs into the lungs every 4 (four) hours as needed for Wheezing or Shortness of Breath. 18 g 0    neomycin-polymyxin-hydrocortisone 3.5-22970-1 Otic Solution Place 3 drops into the right ear 3 (three) times daily. (Patient not taking: Reported on 5/5/2025) 10 mL 0

## 2025-05-21 NOTE — TELEPHONE ENCOUNTER
Hi!    Please let patient know that I have reviewed her DEXA scan and it shows that she has normal bone mineral density.     Please also send her a copy of the results or upload to [employer]. Thank you!

## 2025-05-28 NOTE — TELEPHONE ENCOUNTER
Per Telephone Encounter 3/17/2025:      And it was sent from \"(...)[facility] @ [phone](...)\"      There is not Media or Imaging showing a Dexa scan completed this year so it can be sent to patient - patient would need to request it from Dexa from the business who performed it.

## (undated) NOTE — LETTER
07/09/20        [place] [id number]      Dear Denise Watson,    [id number] Merged with [facility] records indicate that you have outstanding lab work and or testing that was ordered for you and has not yet been completed:  Orders Placed This Encounte

## (undated) NOTE — LETTER
Chantale Badillo, :3/27/1970    CONSENT FOR PROCEDURE/SEDATION    1. I authorize the performance upon Chantale Badillo  the following: Endometrial Biopsy    2. I authorize Dr. Torrie Garcia MD (and whomever is designated as the doctor’s assistant), to perform the above-mentioned procedures.    3. If any unforeseen conditions arise during this procedure calling for additional  procedures, operations, or medications (including anesthesia and blood transfusion), I further request and authorize the doctor to do whatever he/she deems advisable in my interest.    4. I consent to the taking and reproduction of any photographs in the course of this procedure for professional purposes.    5. I consent to the administration of such sedation as may be considered necessary or advisable by the physician responsible for this service, with the exception of ______________________________________________________    6. I have been informed by my doctor of the nature and purpose of this procedure sedation, possible alternative methods of treatment, risk involved and possible complications.    7. If I have a Do Not Resuscitate (DNR) order in place, the physician and I (or the individual authorized to consent on my behalf) will discuss and agree as to whether the Do Not Resuscitate (DNR) order will remain in effect or will be discontinued during the performance of the procedure and the applicable recovery period. If the Do Not Resuscitate (DNR) order is discontinued and is to be reinstated following the procedure/recovery period, the physician will determine when the applicable recovery period ends for purposes of reinstating the Do Not Resuscitate (DNR) order.    Signature of Patient:_______________________________________________    Signature of person authorized to consent for patient:  _______________________________________________________________    Relationship to patient:  ____________________________________________    Witness: _________________________________________ Date:___________     Physician Signature: _______________________________ Date:___________